# Patient Record
Sex: FEMALE | Race: OTHER | HISPANIC OR LATINO | URBAN - METROPOLITAN AREA
[De-identification: names, ages, dates, MRNs, and addresses within clinical notes are randomized per-mention and may not be internally consistent; named-entity substitution may affect disease eponyms.]

---

## 2022-01-01 ENCOUNTER — INPATIENT (INPATIENT)
Facility: HOSPITAL | Age: 87
LOS: 0 days | DRG: 535 | End: 2022-08-10
Attending: STUDENT IN AN ORGANIZED HEALTH CARE EDUCATION/TRAINING PROGRAM | Admitting: STUDENT IN AN ORGANIZED HEALTH CARE EDUCATION/TRAINING PROGRAM
Payer: MEDICAID

## 2022-01-01 VITALS
SYSTOLIC BLOOD PRESSURE: 124 MMHG | HEART RATE: 71 BPM | OXYGEN SATURATION: 97 % | RESPIRATION RATE: 18 BRPM | TEMPERATURE: 98 F | DIASTOLIC BLOOD PRESSURE: 68 MMHG

## 2022-01-01 VITALS — OXYGEN SATURATION: 98 % | HEART RATE: 71 BPM | SYSTOLIC BLOOD PRESSURE: 121 MMHG | DIASTOLIC BLOOD PRESSURE: 48 MMHG

## 2022-01-01 DIAGNOSIS — Z51.5 ENCOUNTER FOR PALLIATIVE CARE: ICD-10-CM

## 2022-01-01 DIAGNOSIS — J96.01 ACUTE RESPIRATORY FAILURE WITH HYPOXIA: ICD-10-CM

## 2022-01-01 DIAGNOSIS — E43 UNSPECIFIED SEVERE PROTEIN-CALORIE MALNUTRITION: ICD-10-CM

## 2022-01-01 DIAGNOSIS — C50.919 MALIGNANT NEOPLASM OF UNSPECIFIED SITE OF UNSPECIFIED FEMALE BREAST: ICD-10-CM

## 2022-01-01 DIAGNOSIS — S72.009A FRACTURE OF UNSPECIFIED PART OF NECK OF UNSPECIFIED FEMUR, INITIAL ENCOUNTER FOR CLOSED FRACTURE: ICD-10-CM

## 2022-01-01 DIAGNOSIS — Z29.9 ENCOUNTER FOR PROPHYLACTIC MEASURES, UNSPECIFIED: ICD-10-CM

## 2022-01-01 DIAGNOSIS — S72.002A FRACTURE OF UNSPECIFIED PART OF NECK OF LEFT FEMUR, INITIAL ENCOUNTER FOR CLOSED FRACTURE: ICD-10-CM

## 2022-01-01 DIAGNOSIS — R53.81 OTHER MALAISE: ICD-10-CM

## 2022-01-01 DIAGNOSIS — E44.0 MODERATE PROTEIN-CALORIE MALNUTRITION: ICD-10-CM

## 2022-01-01 DIAGNOSIS — Z90.11 ACQUIRED ABSENCE OF RIGHT BREAST AND NIPPLE: Chronic | ICD-10-CM

## 2022-01-01 DIAGNOSIS — N17.9 ACUTE KIDNEY FAILURE, UNSPECIFIED: ICD-10-CM

## 2022-01-01 DIAGNOSIS — N39.0 URINARY TRACT INFECTION, SITE NOT SPECIFIED: ICD-10-CM

## 2022-01-01 DIAGNOSIS — G93.40 ENCEPHALOPATHY, UNSPECIFIED: ICD-10-CM

## 2022-01-01 DIAGNOSIS — M25.552 PAIN IN LEFT HIP: ICD-10-CM

## 2022-01-01 DIAGNOSIS — N28.9 DISORDER OF KIDNEY AND URETER, UNSPECIFIED: ICD-10-CM

## 2022-01-01 DIAGNOSIS — I10 ESSENTIAL (PRIMARY) HYPERTENSION: ICD-10-CM

## 2022-01-01 DIAGNOSIS — F03.90 UNSPECIFIED DEMENTIA WITHOUT BEHAVIORAL DISTURBANCE: ICD-10-CM

## 2022-01-01 LAB
A1C WITH ESTIMATED AVERAGE GLUCOSE RESULT: 6.6 % — HIGH (ref 4–5.6)
ALBUMIN SERPL ELPH-MCNC: 1.6 G/DL — LOW (ref 3.5–5)
ALBUMIN SERPL ELPH-MCNC: 1.7 G/DL — LOW (ref 3.5–5)
ALP SERPL-CCNC: 88 U/L — SIGNIFICANT CHANGE UP (ref 40–120)
ALP SERPL-CCNC: 88 U/L — SIGNIFICANT CHANGE UP (ref 40–120)
ALT FLD-CCNC: 15 U/L DA — SIGNIFICANT CHANGE UP (ref 10–60)
ALT FLD-CCNC: 16 U/L DA — SIGNIFICANT CHANGE UP (ref 10–60)
ANION GAP SERPL CALC-SCNC: 4 MMOL/L — LOW (ref 5–17)
ANION GAP SERPL CALC-SCNC: 4 MMOL/L — LOW (ref 5–17)
APPEARANCE UR: CLEAR — SIGNIFICANT CHANGE UP
APTT BLD: 25.7 SEC — LOW (ref 27.5–35.5)
AST SERPL-CCNC: 11 U/L — SIGNIFICANT CHANGE UP (ref 10–40)
AST SERPL-CCNC: 12 U/L — SIGNIFICANT CHANGE UP (ref 10–40)
BASOPHILS # BLD AUTO: 0.05 K/UL — SIGNIFICANT CHANGE UP (ref 0–0.2)
BASOPHILS NFR BLD AUTO: 0.3 % — SIGNIFICANT CHANGE UP (ref 0–2)
BILIRUB SERPL-MCNC: 0.3 MG/DL — SIGNIFICANT CHANGE UP (ref 0.2–1.2)
BILIRUB SERPL-MCNC: 0.3 MG/DL — SIGNIFICANT CHANGE UP (ref 0.2–1.2)
BILIRUB UR-MCNC: NEGATIVE — SIGNIFICANT CHANGE UP
BUN SERPL-MCNC: 53 MG/DL — HIGH (ref 7–18)
BUN SERPL-MCNC: 53 MG/DL — HIGH (ref 7–18)
CALCIUM SERPL-MCNC: 9.2 MG/DL — SIGNIFICANT CHANGE UP (ref 8.4–10.5)
CALCIUM SERPL-MCNC: 9.7 MG/DL — SIGNIFICANT CHANGE UP (ref 8.4–10.5)
CHLORIDE SERPL-SCNC: 101 MMOL/L — SIGNIFICANT CHANGE UP (ref 96–108)
CHLORIDE SERPL-SCNC: 98 MMOL/L — SIGNIFICANT CHANGE UP (ref 96–108)
CO2 SERPL-SCNC: 32 MMOL/L — HIGH (ref 22–31)
CO2 SERPL-SCNC: 36 MMOL/L — HIGH (ref 22–31)
COLOR SPEC: YELLOW — SIGNIFICANT CHANGE UP
CREAT SERPL-MCNC: 1.55 MG/DL — HIGH (ref 0.5–1.3)
CREAT SERPL-MCNC: 1.66 MG/DL — HIGH (ref 0.5–1.3)
D DIMER BLD IA.RAPID-MCNC: 1659 NG/ML DDU — HIGH
DIFF PNL FLD: ABNORMAL
EGFR: 29 ML/MIN/1.73M2 — LOW
EGFR: 31 ML/MIN/1.73M2 — LOW
EOSINOPHIL # BLD AUTO: 0.02 K/UL — SIGNIFICANT CHANGE UP (ref 0–0.5)
EOSINOPHIL NFR BLD AUTO: 0.1 % — SIGNIFICANT CHANGE UP (ref 0–6)
ESTIMATED AVERAGE GLUCOSE: 143 MG/DL — HIGH (ref 68–114)
GLUCOSE SERPL-MCNC: 153 MG/DL — HIGH (ref 70–99)
GLUCOSE SERPL-MCNC: 188 MG/DL — HIGH (ref 70–99)
GLUCOSE UR QL: NEGATIVE — SIGNIFICANT CHANGE UP
HCT VFR BLD CALC: 35.4 % — SIGNIFICANT CHANGE UP (ref 34.5–45)
HCT VFR BLD CALC: 37.1 % — SIGNIFICANT CHANGE UP (ref 34.5–45)
HGB BLD-MCNC: 10.5 G/DL — LOW (ref 11.5–15.5)
HGB BLD-MCNC: 10.6 G/DL — LOW (ref 11.5–15.5)
IMM GRANULOCYTES NFR BLD AUTO: 2.1 % — HIGH (ref 0–1.5)
INR BLD: 0.98 RATIO — SIGNIFICANT CHANGE UP (ref 0.88–1.16)
KETONES UR-MCNC: NEGATIVE — SIGNIFICANT CHANGE UP
LACTATE SERPL-SCNC: 1.5 MMOL/L — SIGNIFICANT CHANGE UP (ref 0.7–2)
LEUKOCYTE ESTERASE UR-ACNC: ABNORMAL
LYMPHOCYTES # BLD AUTO: 1.88 K/UL — SIGNIFICANT CHANGE UP (ref 1–3.3)
LYMPHOCYTES # BLD AUTO: 12.7 % — LOW (ref 13–44)
MAGNESIUM SERPL-MCNC: 2.6 MG/DL — SIGNIFICANT CHANGE UP (ref 1.6–2.6)
MCHC RBC-ENTMCNC: 24.6 PG — LOW (ref 27–34)
MCHC RBC-ENTMCNC: 24.8 PG — LOW (ref 27–34)
MCHC RBC-ENTMCNC: 28.6 GM/DL — LOW (ref 32–36)
MCHC RBC-ENTMCNC: 29.7 GM/DL — LOW (ref 32–36)
MCV RBC AUTO: 83.5 FL — SIGNIFICANT CHANGE UP (ref 80–100)
MCV RBC AUTO: 86.1 FL — SIGNIFICANT CHANGE UP (ref 80–100)
MONOCYTES # BLD AUTO: 0.87 K/UL — SIGNIFICANT CHANGE UP (ref 0–0.9)
MONOCYTES NFR BLD AUTO: 5.9 % — SIGNIFICANT CHANGE UP (ref 2–14)
NEUTROPHILS # BLD AUTO: 11.63 K/UL — HIGH (ref 1.8–7.4)
NEUTROPHILS NFR BLD AUTO: 78.9 % — HIGH (ref 43–77)
NITRITE UR-MCNC: NEGATIVE — SIGNIFICANT CHANGE UP
NRBC # BLD: 0 /100 WBCS — SIGNIFICANT CHANGE UP (ref 0–0)
NRBC # BLD: 1 /100 WBCS — HIGH (ref 0–0)
PH UR: 5 — SIGNIFICANT CHANGE UP (ref 5–8)
PHOSPHATE SERPL-MCNC: 5.3 MG/DL — HIGH (ref 2.5–4.5)
PLATELET # BLD AUTO: 313 K/UL — SIGNIFICANT CHANGE UP (ref 150–400)
PLATELET # BLD AUTO: 337 K/UL — SIGNIFICANT CHANGE UP (ref 150–400)
POTASSIUM SERPL-MCNC: 5.1 MMOL/L — SIGNIFICANT CHANGE UP (ref 3.5–5.3)
POTASSIUM SERPL-MCNC: 5.6 MMOL/L — HIGH (ref 3.5–5.3)
POTASSIUM SERPL-SCNC: 5.1 MMOL/L — SIGNIFICANT CHANGE UP (ref 3.5–5.3)
POTASSIUM SERPL-SCNC: 5.6 MMOL/L — HIGH (ref 3.5–5.3)
PROT SERPL-MCNC: 7.3 G/DL — SIGNIFICANT CHANGE UP (ref 6–8.3)
PROT SERPL-MCNC: 7.3 G/DL — SIGNIFICANT CHANGE UP (ref 6–8.3)
PROT UR-MCNC: 100
PROTHROM AB SERPL-ACNC: 11.6 SEC — SIGNIFICANT CHANGE UP (ref 10.5–13.4)
RAPID RVP RESULT: SIGNIFICANT CHANGE UP
RBC # BLD: 4.24 M/UL — SIGNIFICANT CHANGE UP (ref 3.8–5.2)
RBC # BLD: 4.31 M/UL — SIGNIFICANT CHANGE UP (ref 3.8–5.2)
RBC # FLD: 16.9 % — HIGH (ref 10.3–14.5)
RBC # FLD: 17.3 % — HIGH (ref 10.3–14.5)
SARS-COV-2 RNA SPEC QL NAA+PROBE: SIGNIFICANT CHANGE UP
SODIUM SERPL-SCNC: 137 MMOL/L — SIGNIFICANT CHANGE UP (ref 135–145)
SODIUM SERPL-SCNC: 138 MMOL/L — SIGNIFICANT CHANGE UP (ref 135–145)
SP GR SPEC: 1.01 — SIGNIFICANT CHANGE UP (ref 1.01–1.02)
UROBILINOGEN FLD QL: 1
WBC # BLD: 14.76 K/UL — HIGH (ref 3.8–10.5)
WBC # BLD: 16.56 K/UL — HIGH (ref 3.8–10.5)
WBC # FLD AUTO: 14.76 K/UL — HIGH (ref 3.8–10.5)
WBC # FLD AUTO: 16.56 K/UL — HIGH (ref 3.8–10.5)

## 2022-01-01 PROCEDURE — 84100 ASSAY OF PHOSPHORUS: CPT

## 2022-01-01 PROCEDURE — 85379 FIBRIN DEGRADATION QUANT: CPT

## 2022-01-01 PROCEDURE — 99284 EMERGENCY DEPT VISIT MOD MDM: CPT

## 2022-01-01 PROCEDURE — 71045 X-RAY EXAM CHEST 1 VIEW: CPT

## 2022-01-01 PROCEDURE — G1004: CPT

## 2022-01-01 PROCEDURE — 83605 ASSAY OF LACTIC ACID: CPT

## 2022-01-01 PROCEDURE — 72192 CT PELVIS W/O DYE: CPT | Mod: MG

## 2022-01-01 PROCEDURE — 99497 ADVNCD CARE PLAN 30 MIN: CPT | Mod: 25

## 2022-01-01 PROCEDURE — 87086 URINE CULTURE/COLONY COUNT: CPT

## 2022-01-01 PROCEDURE — 81001 URINALYSIS AUTO W/SCOPE: CPT

## 2022-01-01 PROCEDURE — 85025 COMPLETE CBC W/AUTO DIFF WBC: CPT

## 2022-01-01 PROCEDURE — 71275 CT ANGIOGRAPHY CHEST: CPT | Mod: 26,MG

## 2022-01-01 PROCEDURE — 93005 ELECTROCARDIOGRAM TRACING: CPT

## 2022-01-01 PROCEDURE — 99285 EMERGENCY DEPT VISIT HI MDM: CPT

## 2022-01-01 PROCEDURE — 99222 1ST HOSP IP/OBS MODERATE 55: CPT

## 2022-01-01 PROCEDURE — 99254 IP/OBS CNSLTJ NEW/EST MOD 60: CPT

## 2022-01-01 PROCEDURE — 86738 MYCOPLASMA ANTIBODY: CPT

## 2022-01-01 PROCEDURE — 85610 PROTHROMBIN TIME: CPT

## 2022-01-01 PROCEDURE — 85027 COMPLETE CBC AUTOMATED: CPT

## 2022-01-01 PROCEDURE — 83735 ASSAY OF MAGNESIUM: CPT

## 2022-01-01 PROCEDURE — 72192 CT PELVIS W/O DYE: CPT | Mod: 26,MG

## 2022-01-01 PROCEDURE — 99223 1ST HOSP IP/OBS HIGH 75: CPT

## 2022-01-01 PROCEDURE — 85730 THROMBOPLASTIN TIME PARTIAL: CPT

## 2022-01-01 PROCEDURE — 80053 COMPREHEN METABOLIC PANEL: CPT

## 2022-01-01 PROCEDURE — 71045 X-RAY EXAM CHEST 1 VIEW: CPT | Mod: 26

## 2022-01-01 PROCEDURE — 0225U NFCT DS DNA&RNA 21 SARSCOV2: CPT

## 2022-01-01 PROCEDURE — 71275 CT ANGIOGRAPHY CHEST: CPT | Mod: MG

## 2022-01-01 PROCEDURE — 83036 HEMOGLOBIN GLYCOSYLATED A1C: CPT

## 2022-01-01 PROCEDURE — 87040 BLOOD CULTURE FOR BACTERIA: CPT

## 2022-01-01 PROCEDURE — 87186 SC STD MICRODIL/AGAR DIL: CPT

## 2022-01-01 PROCEDURE — 36415 COLL VENOUS BLD VENIPUNCTURE: CPT

## 2022-01-01 RX ORDER — ACETAMINOPHEN 500 MG
650 TABLET ORAL EVERY 6 HOURS
Refills: 0 | Status: DISCONTINUED | OUTPATIENT
Start: 2022-01-01 | End: 2022-01-01

## 2022-01-01 RX ORDER — ACETAMINOPHEN 500 MG
650 TABLET ORAL ONCE
Refills: 0 | Status: COMPLETED | OUTPATIENT
Start: 2022-01-01 | End: 2022-01-01

## 2022-01-01 RX ORDER — AZTREONAM 2 G
2000 VIAL (EA) INJECTION ONCE
Refills: 0 | Status: COMPLETED | OUTPATIENT
Start: 2022-01-01 | End: 2022-01-01

## 2022-01-01 RX ORDER — ACETAMINOPHEN 500 MG
1000 TABLET ORAL ONCE
Refills: 0 | Status: DISCONTINUED | OUTPATIENT
Start: 2022-01-01 | End: 2022-01-01

## 2022-01-01 RX ORDER — HYDROMORPHONE HYDROCHLORIDE 2 MG/ML
0.5 INJECTION INTRAMUSCULAR; INTRAVENOUS; SUBCUTANEOUS
Refills: 0 | Status: DISCONTINUED | OUTPATIENT
Start: 2022-01-01 | End: 2022-01-01

## 2022-01-01 RX ORDER — ASPIRIN/CALCIUM CARB/MAGNESIUM 324 MG
0 TABLET ORAL
Qty: 0 | Refills: 0 | DISCHARGE

## 2022-01-01 RX ORDER — HALOPERIDOL DECANOATE 100 MG/ML
0.5 INJECTION INTRAMUSCULAR EVERY 6 HOURS
Refills: 0 | Status: DISCONTINUED | OUTPATIENT
Start: 2022-01-01 | End: 2022-01-01

## 2022-01-01 RX ORDER — CEFEPIME 1 G/1
2000 INJECTION, POWDER, FOR SOLUTION INTRAMUSCULAR; INTRAVENOUS EVERY 12 HOURS
Refills: 0 | Status: DISCONTINUED | OUTPATIENT
Start: 2022-01-01 | End: 2022-01-01

## 2022-01-01 RX ORDER — VANCOMYCIN HCL 1 G
1000 VIAL (EA) INTRAVENOUS ONCE
Refills: 0 | Status: COMPLETED | OUTPATIENT
Start: 2022-01-01 | End: 2022-01-01

## 2022-01-01 RX ORDER — SODIUM CHLORIDE 9 MG/ML
1000 INJECTION INTRAMUSCULAR; INTRAVENOUS; SUBCUTANEOUS ONCE
Refills: 0 | Status: COMPLETED | OUTPATIENT
Start: 2022-01-01 | End: 2022-01-01

## 2022-01-01 RX ORDER — SODIUM CHLORIDE 9 MG/ML
1000 INJECTION, SOLUTION INTRAVENOUS
Refills: 0 | Status: DISCONTINUED | OUTPATIENT
Start: 2022-01-01 | End: 2022-01-01

## 2022-01-01 RX ORDER — CANDESARTAN CILEXETIL 8 MG/1
0 TABLET ORAL
Qty: 0 | Refills: 0 | DISCHARGE

## 2022-01-01 RX ORDER — METRONIDAZOLE 500 MG
500 TABLET ORAL EVERY 8 HOURS
Refills: 0 | Status: DISCONTINUED | OUTPATIENT
Start: 2022-01-01 | End: 2022-01-01

## 2022-01-01 RX ORDER — HYDROMORPHONE HYDROCHLORIDE 2 MG/ML
0.5 INJECTION INTRAMUSCULAR; INTRAVENOUS; SUBCUTANEOUS ONCE
Refills: 0 | Status: DISCONTINUED | OUTPATIENT
Start: 2022-01-01 | End: 2022-01-01

## 2022-01-01 RX ORDER — HEPARIN SODIUM 5000 [USP'U]/ML
5000 INJECTION INTRAVENOUS; SUBCUTANEOUS ONCE
Refills: 0 | Status: COMPLETED | OUTPATIENT
Start: 2022-01-01 | End: 2022-01-01

## 2022-01-01 RX ORDER — ANASTROZOLE 1 MG/1
0 TABLET ORAL
Qty: 0 | Refills: 0 | DISCHARGE

## 2022-01-01 RX ORDER — CEFEPIME 1 G/1
2000 INJECTION, POWDER, FOR SOLUTION INTRAMUSCULAR; INTRAVENOUS ONCE
Refills: 0 | Status: COMPLETED | OUTPATIENT
Start: 2022-01-01 | End: 2022-01-01

## 2022-01-01 RX ORDER — CEFEPIME 1 G/1
INJECTION, POWDER, FOR SOLUTION INTRAMUSCULAR; INTRAVENOUS
Refills: 0 | Status: DISCONTINUED | OUTPATIENT
Start: 2022-01-01 | End: 2022-01-01

## 2022-01-01 RX ADMIN — HEPARIN SODIUM 5000 UNIT(S): 5000 INJECTION INTRAVENOUS; SUBCUTANEOUS at 06:20

## 2022-01-01 RX ADMIN — Medication 100 MILLIGRAM(S): at 23:35

## 2022-01-01 RX ADMIN — Medication 100 MILLIGRAM(S): at 03:08

## 2022-01-01 RX ADMIN — HYDROMORPHONE HYDROCHLORIDE 0.5 MILLIGRAM(S): 2 INJECTION INTRAMUSCULAR; INTRAVENOUS; SUBCUTANEOUS at 12:55

## 2022-01-01 RX ADMIN — SODIUM CHLORIDE 1000 MILLILITER(S): 9 INJECTION INTRAMUSCULAR; INTRAVENOUS; SUBCUTANEOUS at 12:14

## 2022-01-01 RX ADMIN — HYDROMORPHONE HYDROCHLORIDE 0.5 MILLIGRAM(S): 2 INJECTION INTRAMUSCULAR; INTRAVENOUS; SUBCUTANEOUS at 12:14

## 2022-01-01 RX ADMIN — SODIUM CHLORIDE 1000 MILLILITER(S): 9 INJECTION INTRAMUSCULAR; INTRAVENOUS; SUBCUTANEOUS at 23:36

## 2022-01-01 RX ADMIN — Medication 250 MILLIGRAM(S): at 14:25

## 2022-01-01 RX ADMIN — CEFEPIME 100 MILLIGRAM(S): 1 INJECTION, POWDER, FOR SOLUTION INTRAMUSCULAR; INTRAVENOUS at 03:08

## 2022-08-09 NOTE — ED ADULT TRIAGE NOTE - CCCP TRG CHIEF CMPLNT
Low O2 saturation
You can access the FollowMyHealth Patient Portal offered by [facility] by registering at the following website: [email or web address]. By joining [facility]’s FollowMyHealth portal, you will also be able to view your health information using other applications (apps) compatible with our system.

## 2022-08-09 NOTE — ED PROVIDER NOTE - OBJECTIVE STATEMENT
89 y/o female, accompanied by son here for left leg pain after fall yesterday and hypoxia.  Pt Polish speaking.  Translation services offered however patient prefers to use son at bedside.  Pt sometimes ambulates with a walker.  Yesterday, the patient woke up, and when she went to get up, was weak and fell over.  No head trauma, No LOC.  Family was able to help the patient up.  The patient was able to move all extremities and the patient stayed in bed for the rest of the day.  Today, the family noted the patient could not get up so they called EMS.  Upon EMS arrival the patient was noted to be hypoxic below 70% which increased with supplemental oxygen.  PMHx of breast cancer with right mastectomy done in [place] and HTN.

## 2022-08-09 NOTE — ED ADULT NURSE NOTE - OBJECTIVE STATEMENT
assumed care of 89 yo female who presented to the Ed for c/o SOB and leg pain. Pt placed on stretcher with cardiac monitor. Spo2 100 %

## 2022-08-09 NOTE — ED PROVIDER NOTE - CLINICAL SUMMARY MEDICAL DECISION MAKING FREE TEXT BOX
pt with history of breast cancer s/p mastectomy presents after fall yesterday.  Right lower extremity shortened and rotated.  pt also hypoxic.  Concern for femur fracture and possible PE.  Will check labs, CT chest/ pelvis, labs, and reassess.

## 2022-08-09 NOTE — CHART NOTE - NSCHARTNOTEFT_GEN_A_CORE
Ortho PA made aware of patient with left hip fracture on CT scan. Official consult to follow. Will pre-op for possible left hip surgery tomorrow 8/10/22.  -Keep patient NPO with IV fluids after midnight  -Will discuss surgical intervention with patient and family  -Xray of Pelvis and Left Hip ordered. CT scan shows left femoral neck fracture, but after review may be Intertrochanteric Fracture. Will need xrays to determine procedure.  -Official consult to follow  -Case discussed with Dr. Chavez

## 2022-08-09 NOTE — ED ADULT TRIAGE NOTE - PATIENT ON (OXYGEN DELIVERY METHOD)
[address] [facility]  consulted by Dr. Sam Green for monitoring and adjustment. Indication for treatment: OM R foot  Goal trough: 15 mcg/mL     Pertinent Laboratory Values:   Temp Readings from Last 3 Encounters:   04/04/20 97.7 °F (36.5 °C) (Oral)   04/01/20 98.4 °F (36.9 °C) (Oral)   07/05/19 97.6 °F (36.4 °C) (Oral)     No results for input(s): WBC, LACTATE in the last 72 hours. Recent Labs     04/02/20  0331 04/04/20  0700   CREATININE 0.7* 0.6*     Estimated Creatinine Clearance: 125 mL/min (A) (based on SCr of 0.6 mg/dL (L)). Intake/Output Summary (Last 24 hours) at 4/4/2020 1021  Last data filed at 4/4/2020 0836  Gross per 24 hour   Intake 490 ml   Output 2000 ml   Net -1510 ml     Vancomycin level:   TROUGH:    No results for input(s): VANCOTROUGH in the last 72 hours. RANDOM:  No results for input(s): VANCORANDOM in the last 72 hours.     Assessment:  · WBC and temperature: No new WBC; afebrile  · SCr, BUN, and urine output: stable   · Day(s) of therapy: 13  · Vancomycin level:   · 3/27: 14.6, therapeutic (1250 mg q12h)   · 3/30: 16.9, therapeutic (1250 mg q12h) accumulation  · 4/1: 18.3, therapeutic (1000 mg q12h)    Plan:  · Continue vancomycin 1000 mg q12h   · Pharmacy will continue to monitor patient and adjust therapy as indicated    REPEAT Em Baron FOR 4/6 @5866    Thank you for the consult,    Mahad Cespedes, PharmD, [facility] mask, nonrebreather

## 2022-08-09 NOTE — ED PROVIDER NOTE - PROGRESS NOTE DETAILS
CT shows Comminuted, displaced left femoral neck fracture. ortho PA paged with no answer.  Ortho attending, Dr. Chavez made aware. CTA negaitve for PE.  pt will be admitted to [facility].

## 2022-08-10 NOTE — H&P ADULT - NSHPPHYSICALEXAM_GEN_ALL_CORE
General - Elderly, confused, in respiratory distress  Eyes - PERRLA, EOM intact  ENT - Nonicteric sclerae, PERRLA, EOMI. Oropharynx clear. Moist mucous membranes. Conjunctivae appear well perfused.   Neck - No noticeable or palpable swelling, redness or rash around throat or on face  Lymph Nodes - No lymphadenopathy  Cardiovascular - RRR no m/r/g, no JVD, no carotid bruits  Lungs - (+) b/l crackles on auscultation in all lung fields.  Skin - No rashes, skin warm and dry, no erythematous areas  Abdomen - Normal bowel sounds, abdomen soft and nontender  Rectal – Rectal exam not performed since no symptoms indicated blood loss.  Extremities - (+) Left leg externally rotated and shortened.  Musculoskeletal - 5/5 strength, normal range of motion, no swollen or erythematous joints.  Neuro– Alert and oriented x 1

## 2022-08-10 NOTE — H&P ADULT - ATTENDING COMMENTS
Pt seen and examined  Case discussed with MAR    89 year old woman visiting family from with PMH of HTN, hx of breast cancer Pt seen and examined  Case discussed with MAR    89 year old woman visiting family from with PMH of HTN, hx of breast cancer s/p mastectomy s/p chemo with residual axillary disease ( per family) who was brought in yesterday on account of a fall 2 days prior and inability to ambulate yesterday.   On closer questioning, pt with about 3 days of weakness, fatigue, productive cough, SOB leading to the fall.     Per EMS - pt was hypoxic requiring O2 via non rebreather mask    Vital Signs Last 24 Hrs  T(C): 36.2 (09 Aug 2022 23:43), Max: 36.4 (09 Aug 2022 15:17)  T(F): 97.1 (09 Aug 2022 23:43), Max: 97.6 (09 Aug 2022 15:17)  HR: 68 (09 Aug 2022 23:43) (68 - 71)  BP: 114/64 (09 Aug 2022 23:43) (114/64 - 124/68)  RR: 18 (09 Aug 2022 23:43) (18 - 18)  SpO2: 98% (09 Aug 2022 23:43) (97% - 98%)  Parameters below as of 09 Aug 2022 23:43  Patient On (Oxygen Delivery Method): mask, nonrebreather    Pt seen at bedside with family   Elderly woman, Lying in bed, awake but appears drowsy, confused, in distress - both respiratory and pain  Nonrebreather mask in place @ 15LPM - weaned down to 10LPM with saturation btw %  Appreciable air entry B/L   Crackles more appreciable in the right lung  Soft abdomen NTND   Left lower extremity -shorter than right and externally rotated.  Neuro exam deferred    Labs                         10.5   14.76 )-----------( 337      ( 09 Aug 2022 16:30 )             35.4     0809    138  |  98  |  53<H>  ----------------------------<  153<H>  5.1   |  36<H>  |  1.55<H>    Ca    9.7      09 Aug 2022 16:30  TPro  7.3  /  Alb  1.7<L>  /  TBili  0.3  /  DBili  x   /  AST  11  /  ALT  15  /  AlkPhos  88  08    D-dimer - 1659      Urinalysis Basic - ( 10 Aug 2022 02:31 )  Color: Yellow / Appearance: Clear / S.015 / pH: x  Gluc: x / Ketone: Negative  / Bili: Negative / Urobili: 1   Blood: x / Protein: 100 / Nitrite: Negative   Leuk Esterase: Moderate / RBC: 2-5 /HPF / WBC 11-25 /HPF   Sq Epi: x / Non Sq Epi: Few /HPF / Bacteria: Moderate /HPF     CT chest   Multifocal bronchiectasis most severe in the right middle lobe lingula and medial right lower lobe. Centrilobular and branching linear densities throughout all 5 lobes and a few areas of consolidation in the right upper lobe. There are small left greater than   right pleural effusions.  Mediastinum/Lymph nodes: No thoracic adenopathy.  Bones and Soft Tissues: Status post right mastectomy.    IMPRESSION:    1.  No pulmonary thromboembolism    2.  Findings of chronic large and small airways infection with a few   patchy areas of consolidation which may represent acute worsening or   superimposed bacterial pneumonia Pt seen and examined  Case discussed with MAR    89 year old woman visiting family from with PMH of HTN, hx of breast cancer s/p mastectomy s/p chemo with residual axillary disease ( per family) who was brought in yesterday on account of a fall 2 days prior and inability to ambulate yesterday.   On closer questioning, pt with about 3 days of weakness, fatigue, productive cough, SOB leading to the fall.     Per EMS - pt was hypoxic requiring O2 via non rebreather mask    Vital Signs Last 24 Hrs  T(C): 36.2 (09 Aug 2022 23:43), Max: 36.4 (09 Aug 2022 15:17)  T(F): 97.1 (09 Aug 2022 23:43), Max: 97.6 (09 Aug 2022 15:17)  HR: 68 (09 Aug 2022 23:43) (68 - 71)  BP: 114/64 (09 Aug 2022 23:43) (114/64 - 124/68)  RR: 18 (09 Aug 2022 23:43) (18 - 18)  SpO2: 98% (09 Aug 2022 23:43) (97% - 98%)  Parameters below as of 09 Aug 2022 23:43  Patient On (Oxygen Delivery Method): mask, nonrebreather    Pt seen at bedside with family   Elderly woman, Lying in bed, awake but appears drowsy, confused, in distress - both respiratory and pain  Nonrebreather mask in place @ 15LPM - weaned down to 10LPM with saturation btw %  Appreciable air entry B/L   Crackles more appreciable in the right lung  Soft abdomen NTND   Left lower extremity -shorter than right and externally rotated.  Neuro exam deferred    Labs                         10.5   14.76 )-----------( 337      ( 09 Aug 2022 16:30 )             35.4     0809    138  |  98  |  53<H>  ----------------------------<  153<H>  5.1   |  36<H>  |  1.55<H>    Ca    9.7      09 Aug 2022 16:30  TPro  7.3  /  Alb  1.7<L>  /  TBili  0.3  /  DBili  x   /  AST  11  /  ALT  15  /  AlkPhos  88  08    D-dimer - 1659      Urinalysis Basic - ( 10 Aug 2022 02:31 )  Color: Yellow / Appearance: Clear / S.015 / pH: x  Gluc: x / Ketone: Negative  / Bili: Negative / Urobili: 1   Blood: x / Protein: 100 / Nitrite: Negative   Leuk Esterase: Moderate / RBC: 2-5 /HPF / WBC 11-25 /HPF   Sq Epi: x / Non Sq Epi: Few /HPF / Bacteria: Moderate /HPF     CT chest   Multifocal bronchiectasis most severe in the right middle lobe lingula and medial right lower lobe. Centrilobular and branching linear densities throughout all 5 lobes and a few areas of consolidation in the right upper lobe. There are small left greater than   right pleural effusions.  Mediastinum/Lymph nodes: No thoracic adenopathy.  Bones and Soft Tissues: Status post right mastectomy.  1.  No pulmonary thromboembolism  2.  Findings of chronic large and small airways infection with a few patchy areas of consolidation which may represent acute worsening or superimposed bacterial pneumonia    Impression  89 year old man with PMH as detailed above with symptoms concerning for acute pneumonia with acute respiratory failure with hypoxia and progressive  generalized weakness and debility leading to mechanical fall and left hip fracture   Pt has progressive worsened since ED presentation with acute confusion and increasing oxygen requirement.  During this time goals of care discussed with family and this has been defined within the MOLST form.     A/P   - Acute encephalopathy   - Acute respiratory failure with hypoxia  - Multilobar right sided pneumonia - community acquired  - Suspected chronic bronchiectasis with possible hypercapnic resp failure at baseline  - Dehydration with ? contraction alkalosis  - Left comminuted displaced fracture of the left femur  - UTI   - renal insufficiency - CANDY vs CKD vs CANDY on CKD    Plan   - Admit to medicine  -  Supplemental O2 to keep SaO2 > 96%  - Sepsis work up   - Empiric broad spectrum antibiotics s/p aztreonam 2g initially used because of Penicillin allergy. Will switch to cefepime and flagyl renally dosed  - ID consult in AM  - Gentle IVF hydration   - Orthopedic consult   - Pre-op evaluation after management of acute presentation and patient improves  - Goals of care discussion completed.

## 2022-08-10 NOTE — CONSULT NOTE ADULT - PROBLEM SELECTOR RECOMMENDATION 5
Clinical evidence indicates that the patient has Moderate protein calorie malnutrition/ 2nd degree Albumin 1.6.  Poor po intake prior to admission     In context of Chronic Illness (>1 month)  Energy/Food intake <75% of estimated energy requirement >7 days  Weight loss: Mild  (lbs lost recently)  Body Fat loss: Mild    Muscle mass loss: Mild   Fluid Accumulation: Mild    Strength: weakened Mild     Recommend:   Diet as tolerated aspiration precautions, keep head of bed at least 45 degrees during feeding

## 2022-08-10 NOTE — DISCHARGE NOTE FOR THE EXPIRED PATIENT - HOSPITAL COURSE
89 y/o F with PMH breast CA (s/p right mastectomy) and HTN BIBEMS due to fall admitted for left leg fracture and acute hypoxic respiratory failure.  Patient placed on NRB, unable to clear for surgery.  Increased lethargy, family requested to keep patient comfortable.  After 3 PM patient without pulse or respiratory effort.

## 2022-08-10 NOTE — H&P ADULT - PROBLEM SELECTOR PLAN 4
h/o breast cancer  s/p right mastectomy elevated BUN and Cr, unknown baseline  c/w IVF UA +ve   f/u UCx  Abx

## 2022-08-10 NOTE — CONSULT NOTE ADULT - PROBLEM SELECTOR RECOMMENDATION 9
p/w acute hypoxic respiratory failure, sat < 70%.  pt remains on NRB agitated w dyspnea.   Discussed hospice philosophy and services provided.  Explained given poor prognosis on NRB hours to days pt is appropriate for IPU.  DNR/DNI on file    -dilaudid 0.5 mg IVP q3h prn for dyspnea  -haldol 0.5 mg IVP q6h prn for agitation  -glycopyrrolate 0.2 .g IVP q4 h prn for secretion  -comfort measures only              -CTA chest 8/9/22: chronic large and small airways infection with a few patchy areas of consolidation which may represent acute worsening or superimposed bacterial pneumonia  -CXR: Increased interstitial lung markings with small bilateral pleural effusions and adjacent mild opacities p/w acute hypoxic respiratory failure, sat < 70%.  pt remains on NRB agitated w dyspnea.   Discussed hospice philosophy and services provided.  Explained given poor prognosis on NRB hours to days pt is appropriate for IPU.  DNR/DNI on file    -dilaudid 0.5 mg IVP q3h prn for dyspnea  -haldol 0.5 mg IVP q6h prn for agitation  -glycopyrrolate 0.2 .g IVP q4 h prn for secretion  -comfort measures only  -continue abx               -CTA chest 8/9/22: chronic large and small airways infection with a few patchy areas of consolidation which may represent acute worsening or superimposed bacterial pneumonia  -CXR: Increased interstitial lung markings with small bilateral pleural effusions and adjacent mild opacities

## 2022-08-10 NOTE — GOALS OF CARE CONVERSATION - ADVANCED CARE PLANNING - CONVERSATION DETAILS
Extensive discussion with Ms Caballero pt's daughter at bedside regarding GOC. Pt admitted for fall has facture but found to have bilateral pneumonia requiring 10lit NRB O2 sat 97%. On RA pt is less than 82%. Risks and benefits of intubation CPR discussed with Ms Caballero given her age and comorbs (baseline dementia, dependent for ADLs, HTN). Ms Caballero discussed with her another sister and decided to make patient DNR/DNI. GISELLA signed and drafted in chart. Extensive discussion with Ms Caballero pt's daughter at bedside regarding GOC with shasha  Naina ID # [id number]. Pt admitted for fall has facture but found to have bilateral pneumonia requiring 10lit NRB O2 sat 97%. On RA pt is less than 82%. Risks and benefits of intubation CPR discussed with Ms Caballero given her age and comorbs (baseline dementia, dependent for ADLs, HTN). Ms Caballero discussed with her another sister and decided to make patient DNR/DNI. GISELLA signed and drafted in chart.

## 2022-08-10 NOTE — H&P ADULT - PROBLEM SELECTOR PLAN 8
Will give one dose of heparin for now with scd boot  Primary team to start dvt ppx heparin if not going for surgery

## 2022-08-10 NOTE — H&P ADULT - HISTORY OF PRESENT ILLNESS
89 y/o F with PMH breast CA and HTN BIBEMS due to a fall. Pt is visiting her family from  and normally ambulates with a cane. She felt weak while getting up yesterday and fell on her left side. Her family kept in her in bed for the rest of the day. They called EMS after noting he was not able to get up this morning. The family also endorsed that she had SOB and a productive cough with green sputum since Saturday. En route to ED, EMS reported she was hypoxic with SaO2 < 70%. Denies CP, fevers, chills, palpitations, abdominal pain, N/V/D, and dysuria.     In ED: pt is on 10L non rebreather, sat. 97%. On RA < 82%.  89 y/o F with PMH breast CA and HTN, ?dementia BIBEMS due to a fall. Pt is visiting her family from  and normally ambulates with a cane/assistance. She felt weak while getting up yesterday and fell on her left side. Her family kept in her in bed for the rest of the day. They called EMS after noting she was not able to get up this morning. The family also endorsed that she had SOB and a productive cough with green sputum since Saturday. En route to ED, EMS reported she was hypoxic with SaO2 < 70%. Denies CP, fevers, chills, palpitations, abdominal pain, N/V/D, and dysuria. Pt was confused in ED , AxO 0 and open eyes on verbal stimulus but not following commands     In ED: pt is on 10L non rebreather, sat. 97%. On RA < 82%.

## 2022-08-10 NOTE — H&P ADULT - NSHPREVIEWOFSYSTEMS_GEN_ALL_CORE
CONSTITUTIONAL: No fever, weight loss, or fatigue  RESPIRATORY: (+) SOB with productive cough No wheezing, chills or hemoptysis; No shortness of breath  CARDIOVASCULAR: No chest pain, palpitations, dizziness, or leg swelling  GASTROINTESTINAL: No abdominal pain. No nausea, vomiting, or hematemesis; No diarrhea or constipation. No melena or hematochezia.  GENITOURINARY: No dysuria or hematuria, urinary frequency  NEUROLOGICAL: No headaches, memory loss, loss of strength, numbness, or tremors  ENDOCRINE: No polyuria, polydipsia, or heat/cold intolerance  MUSKULOSKELETAL: (+) Left hip pain. No muscle aches, joint pains  HEME: no easy bruisability, no tender or enlarged lymph nodes  SKIN: No itching, burning, rashes, or lesions .

## 2022-08-10 NOTE — CONSULT NOTE ADULT - SUBJECTIVE AND OBJECTIVE BOX
From: Ceferino Arceo  To: Eladia Castro MD  Sent: 8/2/2019 2:16 PM CDT  Subject: Medication Question    Hi Dr Montilla,    I was just sitting at my desk and broke into tears. Very strange. This hasn't happened without having something on my mind. I'm not over loaded with work and am not stressed. I do feel a bit foggy in the head.     I've contacted scheduling at [facility] and Rachael isn't taking new patients.    Do you have and suggestions. Medication change or?    Thank you,  Ceferino   Pt Name: SALOME CALLAWAY  MRN: [id number]    ORTHOPEDIC CONSULT:    Orthopedic diagnosis:    HPI: Patient is a 89y Female presenting to hospital with hypoxia and left hip pain. Patient is poor historian. History obtained from H&P and daughter Federico Minaya at [phone]. Daughter states that on Saturday, patient was complaining of trouble breathing. On Monday, patient had fall in her home. She was then placed in bed and was unable to get up for rest of day. Family had then called EMS because patient was not getting out of bed.   89 y/o F with PMH breast CA and HTN, ?dementia BIBEMS due to a fall. Pt is visiting her family from  and normally ambulates with a cane/assistance. She felt weak while getting up yesterday and fell on her left side. Her family kept in her in bed for the rest of the day. They called EMS after noting she was not able to get up this morning. The family also endorsed that she had SOB and a productive cough with green sputum since Saturday. En route to ED, EMS reported she was hypoxic with SaO2 < 70%. Denies CP, fevers, chills, palpitations, abdominal pain, N/V/D, and dysuria. Pt was confused in ED , AxO 0 and open eyes on verbal stimulus but not following commands.    PAST MEDICAL & SURGICAL HISTORY:  Breast cancer      HTN (hypertension)      S/P right mastectomy          ALLERGIES: penicillin (Unknown)      MEDICATIONS: acetaminophen  Suppository .. 650 milliGRAM(s) Rectal every 6 hours PRN  cefepime   IVPB      cefepime   IVPB 2000 milliGRAM(s) IV Intermittent every 12 hours  lactated ringers. 1000 milliLiter(s) IV Continuous <Continuous>  metroNIDAZOLE  IVPB 500 milliGRAM(s) IV Intermittent every 8 hours      PHYSICAL EXAM:    Vital Signs Last 24 Hrs  T(C): 36.4 (10 Aug 2022 04:49), Max: 36.4 (09 Aug 2022 15:17)  T(F): 97.6 (10 Aug 2022 04:49), Max: 97.6 (09 Aug 2022 15:17)  HR: 75 (10 Aug 2022 04:49) (68 - 78)  BP: 125/46 (10 Aug 2022 04:49) (108/78 - 125/46)  BP(mean): 65 (10 Aug 2022 04:49) (65 - 65)  RR: 18 (10 Aug 2022 04:49) (18 - 18)  SpO2: 97% (10 Aug 2022 04:49) (97% - 98%)    Parameters below as of 10 Aug 2022 04:49  Patient On (Oxygen Delivery Method): mask, nonrebreather  O2 Flow (L/min): 10      Left Lower Extremity: Skin intact. LLE shortened and externally rotated. Calves soft and NT. Patient unable to follow commands for ROM during exam.         LABS:                        10.5   14.76 )-----------( 337      ( 09 Aug 2022 16:30 )             35.4     08-09    138  |  98  |  53<H>  ----------------------------<  153<H>  5.1   |  36<H>  |  1.55<H>    Ca    9.7      09 Aug 2022 16:30    TPro  7.3  /  Alb  1.7<L>  /  TBili  0.3  /  DBili  x   /  AST  11  /  ALT  15  /  AlkPhos  88  08-09    PT/INR - ( 09 Aug 2022 16:30 )   PT: 11.6 sec;   INR: 0.98 ratio         PTT - ( 09 Aug 2022 16:30 )  PTT:25.7 sec    RADIOLOGY:   < from: CT Pelvis Bony Only No Cont (08.09.22 @ 18:53) >    ACC: [id number] EXAM:  CT PELVIS BONY ONLY                          PROCEDURE DATE:  08/09/2022          INTERPRETATION:  INDICATION: Fall, left hip pain    TECHNIQUE: CT of the pelvis without contrast with axial, sagittal, and   coronal multiplanar reformats.    Comparison: None.    FINDINGS:    Acute, comminuted, displaced fracture of the   intertrochanteric/basicervical left femoral neck. There is shortening at   the fracture site with varus angulation and mild apex anterior angulation   at the fracture site. Left femoral head appears well-seated within the   acetabulum.    Mild bilateral hip arthrosis. Degenerative changes within the lower   lumbar spine, pubic symphysis, and bilateral sacroiliac joints.    Hematoma/soft tissue swelling surrounding the fracture site.    Colonic diverticulosis. Vascular calcifications noted.    IMPRESSION:    Comminuted, displaced left femoral neck fracture, as above.    --- End of Report ---            VANESSA ENEIDA M.D., ATTENDING RADIOLOGIST  This document has been electronically signed. Aug  9 2022  7:12PM    < end of copied text >        IMPRESSION: Pt is a 89y Female with Left Hip Intertrochanteric Fracture    PLAN:  -  Pain management  -  DVT prophylaxis  -  Discussed with daughter (Federico Minaya at [phone]) risks and benefits of conservative mangement and surgical management including but not limited to such as bed ulcers, persistent pain, DVT, PE, pneumonia. At the moment daughter is undecided about conservative or surgical management. She states she would like to discuss after medical evaluation and risk level of patient.   -  Obtain medical clearance. Upon clearance will discuss with daughter again options of conservative vs surgical management of fracture  -  Case discussed with Dr. Chavez

## 2022-08-10 NOTE — CONSULT NOTE ADULT - ASSESSMENT
Confidential Drug Utilization Report  Search Terms: Anna Edwards, 08/08/1929Search Date: 08/10/2022 10:45:25 AM  The Drug Utilization Report below displays all of the controlled substance prescriptions, if any, that your patient has filled in the last twelve months. The information displayed on this report is compiled from pharmacy submissions to the Department, and accurately reflects the information as submitted by the pharmacies.    This report was requested by: Mariluz Rubin | Reference #: [id number]    There are no results for the search terms that you entered.

## 2022-08-10 NOTE — H&P ADULT - PROBLEM SELECTOR PLAN 3
pt BIBEMS due to fall on left side  CT Pelvis: Comminuted, displaced left femoral neck fracture, as above.  ortho consulted pt BIBEMS due to fall on left side  CT Pelvis: Comminuted, displaced left femoral neck fracture, as above.  ortho consulted likely go for surgery tomorrow>waiting for final recs   NPO after midnight

## 2022-08-10 NOTE — CONSULT NOTE ADULT - CONVERSATION DETAILS
Met with patient's daughter's Lizz and Federico at the bedside regarding further GOC.   # [id number] facilitated discussion.  Discussed patient's current condition, high risk of mortality on this admission. Family reminisced about the pt and shared she had 12 children 8 of whom are still living, pt is a great mother. Pt came to visit from  in July, celebrated her birthday with plans to return on 8/17.  They do not want her to suffer.    Confirmed MOLST on file: DNR/DNI. Family just wants to kept pt comfortable, they do not want her to suffer. Explained given her prognosis she is appropriate for inpatient hospice. Discussed hospice philosophy and location. Daughter's are agreeable for impatient hospice.   Chaplaincy offered and accepted.    All questions answered.  Support provided.   D/w primary team Met with patient's daughter's Lizz and Elissaparker at the bedside regarding further GOC.   # [id number] facilitated discussion.  Discussed patient's current condition, high risk of mortality on this admission. Family reminisced about the pt and shared she had 12 children 8 of whom are still living, pt is a great mother. Pt came to visit from  in July, celebrated her birthday with plans to return on 8/17.  They do not want her to suffer.    Confirmed MOLST on file: DNR/DNI.  Explained given her prognosis she is appropriate for inpatient hospice. Discussed hospice philosophy and location.  Family just wants to kept pt comfortable, they do not want her to suffer. No further labs. Continue antibiotics.  Daughter's are agreeable for impatient hospice.   Chaplaincy offered and accepted.    All questions answered.  Support provided.   D/w primary team

## 2022-08-10 NOTE — CONSULT NOTE ADULT - PROBLEM SELECTOR RECOMMENDATION 9
Pt with acute left hip pain which is somatic in nature due to left comminuted, displaced femoral neck fracture. GOHCO. Family undecided whether to go forth with surgical route. Pt on O2 via nonrebreather. + CANDY + hx dementia. DNR.   Non-opioid pain recommendations   - Acetaminophen 1 gram IV once PRN moderate pain.   Bowel Regimen  - n/a pt NPO  Mild pain   - Non-pharmacological pain treatment recommendations  - Warm/ Cool packs PRN   - Repositioning, relaxation, distraction.  Recommendations discussed with primary team and RN.

## 2022-08-10 NOTE — CONSULT NOTE ADULT - CONSULT REASON
Discuss complex medical decision making related to goals of care due to AFRF on NRB Discuss complex medical decision making related to goals of care due to AHRF on NRB

## 2022-08-10 NOTE — CONSULT NOTE ADULT - SUBJECTIVE AND OBJECTIVE BOX
Source of information: [facility], Chart review  Patient language: English  : n/a    HPI:  89 y/o F with PMH breast CA and HTN, ?dementia BIBEMS due to a fall. Pt is visiting her family from  and normally ambulates with a cane/assistance. She felt weak while getting up yesterday and fell on her left side. Her family kept in her in bed for the rest of the day. They called EMS after noting she was not able to get up this morning. The family also endorsed that she had SOB and a productive cough with green sputum since Saturday. En route to ED, EMS reported she was hypoxic with SaO2 < 70%. Denies CP, fevers, chills, palpitations, abdominal pain, N/V/D, and dysuria. Pt was confused in ED , AxO 0 and open eyes on verbal stimulus but not following commands     In ED: pt is on 10L non rebreather, sat. 97%. On RA < 82%.  (10 Aug 2022 02:38)      Dx with acute hypoxic respiratory failure and left comminuted, displaced femoral neck fracture. Ddimer . GOHCO. Family undecided whether to go forth with surgical route. + CANDY. + hx dementia. DNR. Pain consulted for left hip pain. Pt seen and examined at bedside. Pt laying in bed, somnolent, O2 via nonrebreather in place. No nonverbal s/s of pain noted. Left leg shortened and externally rotated.     PAST MEDICAL & SURGICAL HISTORY:  Breast cancer      HTN (hypertension)      S/P right mastectomy          FAMILY HISTORY:      Social History:  Unable to assess ETOH use, illicit drug use and smoking    Allergies    penicillin (Unknown)    MEDICATIONS  (STANDING):  cefepime   IVPB      cefepime   IVPB 2000 milliGRAM(s) IV Intermittent every 12 hours  lactated ringers. 1000 milliLiter(s) (60 mL/Hr) IV Continuous <Continuous>  metroNIDAZOLE  IVPB 500 milliGRAM(s) IV Intermittent every 8 hours    MEDICATIONS  (PRN):  acetaminophen  Suppository .. 650 milliGRAM(s) Rectal every 6 hours PRN Temp greater or equal to 38C (100.4F)      Vital Signs Last 24 Hrs  T(C): 36.4 (10 Aug 2022 04:49), Max: 36.4 (09 Aug 2022 15:17)  T(F): 97.6 (10 Aug 2022 04:49), Max: 97.6 (09 Aug 2022 15:17)  HR: 75 (10 Aug 2022 04:49) (68 - 78)  BP: 125/46 (10 Aug 2022 04:49) (108/78 - 125/46)  BP(mean): 65 (10 Aug 2022 04:49) (65 - 65)  RR: 18 (10 Aug 2022 04:49) (18 - 18)  SpO2: 97% (10 Aug 2022 04:49) (97% - 98%)    Parameters below as of 10 Aug 2022 04:49  Patient On (Oxygen Delivery Method): mask, nonrebreather  O2 Flow (L/min): 10      LABS: Reviewed.                          10.6   16.56 )-----------( 313      ( 10 Aug 2022 08:35 )             37.1     08-10    137  |  101  |  53<H>  ----------------------------<  188<H>  5.6<H>   |  32<H>  |  1.66<H>    Ca    9.2      10 Aug 2022 08:35  Phos  5.3     08-10  Mg     2.6     08-10    TPro  7.3  /  Alb  1.6<L>  /  TBili  0.3  /  DBili  x   /  AST  12  /  ALT  16  /  AlkPhos  88  08-10    PT/INR - ( 09 Aug 2022 16:30 )   PT: 11.6 sec;   INR: 0.98 ratio         PTT - ( 09 Aug 2022 16:30 )  PTT:25.7 sec  LIVER FUNCTIONS - ( 10 Aug 2022 08:35 )  Alb: 1.6 g/dL / Pro: 7.3 g/dL / ALK PHOS: 88 U/L / ALT: 16 U/L DA / AST: 12 U/L / GGT: x           Urinalysis Basic - ( 10 Aug 2022 02:31 )    Color: Yellow / Appearance: Clear / S.015 / pH: x  Gluc: x / Ketone: Negative  / Bili: Negative / Urobili: 1   Blood: x / Protein: 100 / Nitrite: Negative   Leuk Esterase: Moderate / RBC: 2-5 /HPF / WBC 11-25 /HPF   Sq Epi: x / Non Sq Epi: Few /HPF / Bacteria: Moderate /HPF      CAPILLARY BLOOD GLUCOSE        SARS-CoV-2: NotDetec (09 Aug 2022 16:30)      Radiology: Reviewed.   < from: CT Angio Chest PE Protocol w/ IV Cont (22 @ 18:54) >    ACC: [id number] EXAM:  CT ANGIO CHEST PULM ART [facility]                          PROCEDURE DATE:  2022          INTERPRETATION:  Clinical information: Shortness of breath, hypoxia,   history of breast cancer, assess for PE    TECHNIQUE: A volumetric acquisition of the chest was obtained from the   thoracic inlet to the upper abdomen during dynamic administration of   intravenous contrast. 3D MIP images were provided.    CONTRAST/COMPLICATIONS:  IV Contrast: Omnipaque 350  73 cc administered   27 cc discarded  Oral Contrast: NONE  Complications: None reported at time of study completion    COMPARISON: No prior chest CT for comparison    FINDINGS:    CTA: The study is technically adequate with a good contrast bolus to the   pulmonary arteries. There are no filling defects in the pulmonary artery   or its branches. The heart is enlarged. No pericardial effusion. The mid   ascending aorta measures 3.5 cm. Calcified and noncalcified plaque in the   aortic arch.    Lungs/Airways/Pleura: Multifocal bronchiectasis most severe in the right   middle lobe lingula and medial right lower lobe. Centrilobular and   branching linear densities throughout all 5 lobes and a few areas of   consolidation in the right upper lobe. There are small left greater than   right pleural effusions.    Mediastinum/Lymph nodes: No thoracic adenopathy.    Upper Abdomen: There are bilateral renal cysts.    Bones and Soft Tissues: Status post right mastectomy.    IMPRESSION:    1.  No pulmonary thromboembolism    2.  Findings of chronic large and small airways infection with a few   patchy areas of consolidation which may represent acute worsening or   superimposed bacterial pneumonia    3.  Small left greater than right pleural effusions    --- End of Report---             KELVIN MACK M.D., Attending Radiologist  This document has been electronically signed. Aug  9 2022  8:13PM    < end of copied text >    < from: CT Pelvis Bony Only No Cont (22 @ 18:53) >    ACC: [id number] EXAM:  CT PELVIS BONY ONLY                          PROCEDURE DATE:  2022          INTERPRETATION:  INDICATION: Fall, left hip pain    TECHNIQUE: CT of the pelvis without contrast with axial, sagittal, and   coronal multiplanar reformats.    Comparison: None.    FINDINGS:    Acute, comminuted, displaced fracture of the   intertrochanteric/basicervical left femoral neck. There is shortening at   the fracture site with varus angulation and mild apex anterior angulation   at the fracture site. Left femoral head appears well-seated within the   acetabulum.    Mild bilateral hip arthrosis. Degenerative changes within the lower   lumbar spine, pubic symphysis, and bilateral sacroiliac joints.    Hematoma/soft tissue swelling surrounding the fracture site.    Colonic diverticulosis. Vascular calcifications noted.    IMPRESSION:    Comminuted, displaced left femoral neck fracture, as above.    --- End of Report ---            VANESSA MONIQUE M.D., ATTENDING RADIOLOGIST  This document has been electronically signed. Aug  9 2022  7:12PM    < end of copied text >      ORT Score -   unable to assess.     REVIEW OF SYSTEMS:  unable to assess    PHYSICAL EXAM:  limited d/t dementia  GENERAL:  + somnolent, no nonverbal s/s pain noted   RESPIRATORY: Respirations even and unlabored. Diminished to auscultation bilaterally; No rales, rhonchi, wheezing, or rubs + O2 via Nonrebreather   CARDIOVASCULAR: Normal S1/S2, regular rate and rhythm; No murmurs, rubs, or gallops. No JVD.   GASTROINTESTINAL:  Soft, Nontender, Nondistended; Bowel sounds present  PERIPHERAL VASCULAR:  Extremities warm without edema. 2+ Peripheral Pulses, No cyanosis   MUSCULOSKELETAL: Left leg shortened and externally rotated. Unable to assess motor Strength   SKIN: Warm, dry, intact. No rashes, lesions, scars or wounds.     Risk factors associated with adverse outcomes related to opioid treatment  [ ]  Concurrent benzodiazepine use  [ ]  History/ Active substance use or alcohol use disorder  [X ] Psychiatric co-morbidity  [ ] Sleep apnea  [ ] COPD  [ ] BMI> 35  [ ] Liver dysfunction  [X ] Renal dysfunction  [ ] CHF  [ ] Smoker  [X ]  Age > 60 years    [X ]  [place]  Reviewed and Copied to Chart. See below.

## 2022-08-10 NOTE — H&P ADULT - PROBLEM SELECTOR PLAN 2
pt is AAOx1 and started becoming confused in ED  likely due to acute hypoxic respiratory failure vs. left hip fracture pt was AAOx1 and started becoming more confused in ED  likely due to acute hypoxic respiratory failure vs. left hip fracture  No focal neurologic deficit on exam   -started on cefepime and flagyl   -f/u legionella  and mycoplasma antigen ab  -f/u BCx  -ID consulted Dr. Yusuf

## 2022-08-10 NOTE — H&P ADULT - PROBLEM SELECTOR PLAN 5
h/o HTN  unknown home meds  Primary team to confirm med recs h/o breast cancer  s/p right mastectomy elevated BUN and Cr, unknown baseline  BUN/Cr 53/1.5  c/w IVF  Avoid nephrotoxic  agent   s/p contrast in ED to r/o PE given elevated D dimer and hypoxia   Monitor UO and renal function

## 2022-08-10 NOTE — CONSULT NOTE ADULT - ASSESSMENT
HPI:  89 y/o F with PMH breast CA and HTN, ?dementia BIBEMS due to a fall. Pt is visiting her family from  and normally ambulates with a cane/assistance. She felt weak while getting up yesterday and fell on her left side. Her family kept in her in bed for the rest of the day. They called EMS after noting she was not able to get up this morning. The family also endorsed that she had SOB and a productive cough with green sputum since Saturday. En route to ED, EMS reported she was hypoxic with SaO2 < 70%. Denies CP, fevers, chills, palpitations, abdominal pain, N/V/D, and dysuria. Pt was confused in ED , AxO 0 and open eyes on verbal stimulus but not following commands     In ED: pt is on 10L non rebreather, sat. 97%. On RA < 82%.  (10 Aug 2022 02:38)      ALLERGIES penicillin (Unknown)    PAST MEDICAL & SURGICAL HISTORY:  Breast cancer      HTN (hypertension)      S/P right mastectomy        FAMILY HISTORY:    SOCIAL HISTORY:    REVIEW OF SYSTEMS:  CONSTITUTIONAL:  No weakness, fevers or chills  EYES/ENT:  No visual changes;  No vertigo or throat pain   NECK:  No pain or stiffness  RESPIRATORY:  No cough, wheezing, hemoptysis; No shortness of breath  CARDIOVASCULAR:  No chest pain or palpitations  GASTROINTESTINAL:  No abdominal or epigastric pain. No nausea, vomiting, or hematemesis; No diarrhea or constipation. No melena or hematochezia.  GENITOURINARY:  No dysuria, frequency or hematuria  NEUROLOGICAL:  No numbness or weakness  MSK: no back pain, no joint pain  SKIN:  No itching, rashes    PHYSICAL EXAM:  Vital Signs Last 24 Hrs  T(C): 36.4 (10 Aug 2022 04:49), Max: 36.4 (09 Aug 2022 15:17)  T(F): 97.6 (10 Aug 2022 04:49), Max: 97.6 (09 Aug 2022 15:17)  HR: 75 (10 Aug 2022 04:49) (68 - 78)  BP: 125/46 (10 Aug 2022 04:49) (108/78 - 125/46)  BP(mean): 65 (10 Aug 2022 04:49) (65 - 65)  RR: 18 (10 Aug 2022 04:49) (18 - 18)  SpO2: 97% (10 Aug 2022 04:49) (97% - 98%)    Parameters below as of 10 Aug 2022 04:49  Patient On (Oxygen Delivery Method): mask, nonrebreather  O2 Flow (L/min): 10      Gen:  Skin:  HEENT:  Neck:  Chest/Thorax:  Cardiovascular:  Abdomen:  Genitourinary:  Extremities:  Vascular:  MSK:  Neurological:    LABS/DIAGNOSTIC TESTS:                          10.6   16.56 )-----------( 313      ( 10 Aug 2022 08:35 )             37.1     WBC Count: 16.56 K/uL (08-10 @ 08:35)  WBC Count: 14.76 K/uL ( @ 16:30)    08-10    137  |  101  |  53<H>  ----------------------------<  188<H>  5.6<H>   |  32<H>  |  1.66<H>    Ca    9.2      10 Aug 2022 08:35  Phos  5.3     08-10  Mg     2.6     08-10    TPro  7.3  /  Alb  1.6<L>  /  TBili  0.3  /  DBili  x   /  AST  12  /  ALT  16  /  AlkPhos  88  10    Urinalysis Basic - ( 10 Aug 2022 02:31 )    Color: Yellow / Appearance: Clear / S.015 / pH: x  Gluc: x / Ketone: Negative  / Bili: Negative / Urobili: 1   Blood: x / Protein: 100 / Nitrite: Negative   Leuk Esterase: Moderate / RBC: 2-5 /HPF / WBC 11-25 /HPF   Sq Epi: x / Non Sq Epi: Few /HPF / Bacteria: Moderate /HPF      LIVER FUNCTIONS - ( 10 Aug 2022 08:35 )  Alb: 1.6 g/dL / Pro: 7.3 g/dL / ALK PHOS: 88 U/L / ALT: 16 U/L DA / AST: 12 U/L / GGT: x           LACTATE:Lactate, Blood: 1.5 mmol/L ( @ 16:30)      CULTURES:       Rapid RVP Result: NotDetec ( @ 16:30)        RADIOLOGY    ANTIBIOTICS:  cefepime   IVPB    cefepime   IVPB 2000 every 12 hours  metroNIDAZOLE  IVPB 500 every 8 hours      IV LINES:    IMPRESSION AND PLAN:      In progress.......      Please reach ID with any questions or concerns  Dr. Jewels Chandra  Tel. [phone]  Available in Teams               HPI: History obtained from Daughter Ms. Rei Caballero(phone # on record)  89 y/o F with PMH breast CA diagnosed in  S/P R breast mastectomy, did not received Chemo, as per family the CA was spread to axillary Lymphatic system 5 yras ago when attempted surgery back in [place], HTN, Dementia. BIBEMS after fall from her bed when attempting to to to the bathroom on . She was on her regular health state last Friday when the family noted she was weak and dizzy, decreased appetite and cough with sputum production, they asked her to rest, but she continues to feel sick and on top of that had a fall on [place]. Afebrile, no N/V or diarrhea, no urinary symptoms, + SOB, she has not used abx recently.  As per ED documentation she arrived confused, placed on non rebreather O2 mask 10 L, her O2 sats falls to 80's whe she is off O2.    ALLERGIES penicillin >10 yrs ago, she developed hives, tolerating cephalosporins    PAST MEDICAL & SURGICAL HISTORY: Breast cancer dx , surgery, 5 years ago sx but extensive mets encountered(as per daughter) she never received chemotherapy  HTN (hypertension)  S/P right mastectomy  FAMILY HISTORY: Non contributory  SOCIAL HISTORY: no toxic habits    REVIEW OF SYSTEMS:  Unable to obtain, the pt is in resp distress, with agonic breathing, obtunded, does not answer to questions    PHYSICAL EXAM:  Vital Signs Last 24 Hrs  T(C): 36.4 (10 Aug 2022 04:49), Max: 36.4 (09 Aug 2022 15:17)  T(F): 97.6 (10 Aug 2022 04:49), Max: 97.6 (09 Aug 2022 15:17)  HR: 75 (10 Aug 2022 04:49) (68 - 78)  BP: 125/46 (10 Aug 2022 04:49) (108/78 - 125/46)  BP(mean): 65 (10 Aug 2022 04:49) (65 - 65)  RR: 18 (10 Aug 2022 04:49) (18 - 18)  SpO2: 97% (10 Aug 2022 04:49) (97% - 98%)    Parameters below as of 10 Aug 2022 04:49  Patient On (Oxygen Delivery Method): mask, nonrebreather  O2 Flow (L/min): 10L  Gen: Ill appearing, frail old patient, pale, obtunded, does not follows commands or answer questions  Skin: pale  Head: AT  Neck: Supple  Chest/Thorax: decreased RR on my examination, + crackles BL lungs, agonic breathing, desaturation ~ 80's when the mask gets misplaced  Cardiovascular: S1S2 no murmurs  Abdomen: soft, no tenderness, no distention, BS +  Genitourinary: no Modi  Extremities: no cyanosis  Vascular: pedal pulses +  MSK: Left hip fracture  Neurological: AAOx0, obtunded    LABS/DIAGNOSTIC TESTS:                      10.6   16.56 )-----------( 313      ( 10 Aug 2022 08:35 )             37.1     WBC Count: 16.56 K/uL (08-10 @ 08:35)  WBC Count: 14.76 K/uL ( @ 16:30)    08-10    137  |  101  |  53<H>  ----------------------------<  188<H>  5.6<H>   |  32<H>  |  1.66<H>    Ca    9.2      10 Aug 2022 08:35  Phos  5.3     08-10  Mg     2.6     08-10    TPro  7.3  /  Alb  1.6<L>  /  TBili  0.3  /  DBili  x   /  AST  12  /  ALT  16  /  AlkPhos  88  08-10    Urinalysis Basic - ( 10 Aug 2022 02:31 )    Color: Yellow / Appearance: Clear / S.015 / pH: x  Gluc: x / Ketone: Negative  / Bili: Negative / Urobili: 1   Blood: x / Protein: 100 / Nitrite: Negative   Leuk Esterase: Moderate / RBC: 2-5 /HPF / WBC 11-25 /HPF   Sq Epi: x / Non Sq Epi: Few /HPF / Bacteria: Moderate /HPF       LACTATE:Lactate, Blood: 1.5 mmol/L ( @ 16:30)    CULTURES: pending    Rapid RVP Result: NotDetec ( @ 16:30)    RADIOLOGY:  < from: CT Pelvis Bony Only No Cont (22 @ 18:53) >Comminuted, displaced left femoral neck fracture.    < from: CT Angio Chest PE Protocol w/ IV Cont (22 @ 18:54) >  MPRESSION:  1.  No pulmonary thromboembolism  2.  Findings of chronic large and small airways infection with a few patchy areas of consolidation which may represent acute worsening or superimposed bacterial pneumonia  3.  Small left greater than right pleural effusions    ANTIBIOTICS:   cefepime   IVPB 2000 every 12 hours  metroNIDAZOLE  IVPB 500 every 8 hours    IV LINES: pIV, site OK    IMPRESSION AND PLAN:        Please reach ID with any questions or concerns  Dr. Jewels Chandra  Tel. [phone]  Available in Teams                   HPI: History obtained from Daughter Ms. Rei Caballero(phone # on record)  89 y/o F with PMH breast CA diagnosed in  S/P R breast mastectomy, did not received Chemo, as per family the CA was spread to axillary Lymphatic system 5 yras ago when attempted surgery back in [place], HTN, Dementia. BIBEMS after fall from her bed when attempting to to to the bathroom on . She was on her regular health state last Friday when the family noted she was weak and dizzy, decreased appetite and cough with sputum production, they asked her to rest, but she continues to feel sick and on top of that had a fall on [place]. Afebrile, no N/V or diarrhea, no urinary symptoms, + SOB, she has not used abx recently.  As per ED documentation she arrived confused, placed on non rebreather O2 mask 10 L, her O2 sats falls to 80's whe she is off O2.    ALLERGIES penicillin >10 yrs ago, she developed hives, tolerating cephalosporins    PAST MEDICAL & SURGICAL HISTORY: Breast cancer dx , surgery, 5 years ago sx but extensive mets encountered(as per daughter) she never received chemotherapy  HTN (hypertension)  S/P right mastectomy  FAMILY HISTORY: Non contributory  SOCIAL HISTORY: no toxic habits    REVIEW OF SYSTEMS:  Unable to obtain, the pt is in resp distress, with agonic breathing, obtunded, does not answer to questions    PHYSICAL EXAM:  Vital Signs Last 24 Hrs  T(C): 36.4 (10 Aug 2022 04:49), Max: 36.4 (09 Aug 2022 15:17)  T(F): 97.6 (10 Aug 2022 04:49), Max: 97.6 (09 Aug 2022 15:17)  HR: 75 (10 Aug 2022 04:49) (68 - 78)  BP: 125/46 (10 Aug 2022 04:49) (108/78 - 125/46)  BP(mean): 65 (10 Aug 2022 04:49) (65 - 65)  RR: 18 (10 Aug 2022 04:49) (18 - 18)  SpO2: 97% (10 Aug 2022 04:49) (97% - 98%)    Parameters below as of 10 Aug 2022 04:49  Patient On (Oxygen Delivery Method): mask, nonrebreather  O2 Flow (L/min): 10L  Gen: Ill appearing, frail old patient, pale, obtunded, does not follows commands or answer questions  Skin: pale  Head: AT  Neck: Supple  Chest/Thorax: decreased RR on my examination, + crackles BL lungs, agonic breathing, desaturation ~ 80's when the mask gets misplaced  Cardiovascular: S1S2 no murmurs  Abdomen: soft, no tenderness, no distention, BS +  Genitourinary: no Modi  Extremities: no cyanosis  Vascular: pedal pulses +  MSK: Left hip fracture  Neurological: AAOx0, obtunded    LABS/DIAGNOSTIC TESTS:                      10.6   16.56 )-----------( 313      ( 10 Aug 2022 08:35 )             37.1     WBC Count: 16.56 K/uL (08-10 @ 08:35)  WBC Count: 14.76 K/uL ( @ 16:30)    08-10    137  |  101  |  53<H>  ----------------------------<  188<H>  5.6<H>   |  32<H>  |  1.66<H>    Ca    9.2      10 Aug 2022 08:35  Phos  5.3     08-10  Mg     2.6     08-10    TPro  7.3  /  Alb  1.6<L>  /  TBili  0.3  /  DBili  x   /  AST  12  /  ALT  16  /  AlkPhos  88  08-10    Urinalysis Basic - ( 10 Aug 2022 02:31 )    Color: Yellow / Appearance: Clear / S.015 / pH: x  Gluc: x / Ketone: Negative  / Bili: Negative / Urobili: 1   Blood: x / Protein: 100 / Nitrite: Negative   Leuk Esterase: Moderate / RBC: 2-5 /HPF / WBC 11-25 /HPF   Sq Epi: x / Non Sq Epi: Few /HPF / Bacteria: Moderate /HPF       LACTATE:Lactate, Blood: 1.5 mmol/L ( @ 16:30)    CULTURES: pending    Rapid RVP Result: NotDetec ( @ 16:30)    RADIOLOGY:  < from: CT Pelvis Bony Only No Cont (22 @ 18:53) >Comminuted, displaced left femoral neck fracture.    < from: CT Angio Chest PE Protocol w/ IV Cont (22 @ 18:54) >  MPRESSION:  1.  No pulmonary thromboembolism  2.  Findings of chronic large and small airways infection with a few patchy areas of consolidation which may represent acute worsening or superimposed bacterial pneumonia  3.  Small left greater than right pleural effusions    ANTIBIOTICS:   cefepime   IVPB 2000 every 12 hours  metroNIDAZOLE  IVPB 500 every 8 hours    IV LINES: pIV, site OK    IMPRESSION AND PLAN:    -CAP  -Displaced Left femoral neck fracture S/P fall  -H/O Bronchiectasis  -H/O Breast CA diagnosed in  S/P R mastectomy as per family CA advanced w/ mets  -PCN allergy >10 yrs ago(Hives) tolerating cephalosporins    Please d/c cefepime/flagyl  Start Ceftriaxone 2g daily IV  Start Azithromycin for atypical coverage 500 mg 1st day then 250 mg daily x 4 days()  Follow up cultures  Send sputum cx if able  Legionella AG urine, strept pneumo, mycoplasma  Pt DNI/DNR clarify with family all interventions they would want in this case  Discussed with medicine attending    Please reach ID with any questions or concerns  Dr. Jewels Chandra  Tel. [phone]  Available in Teams                   HPI: History obtained from Daughter Ms. Rei Caballero(phone # on record)  89 y/o F with PMH breast CA diagnosed in  S/P R breast mastectomy, did not received Chemo, as per family the CA was spread to axillary Lymphatic system 5 yrs ago when attempted surgery back in [place], HTN, Dementia. BIBEMS after fall from her bed when attempting to to to the bathroom on . She was on her regular health state last Friday when the family noted she was weak and dizzy, decreased appetite and cough with sputum production, they asked her to rest, but she continues to feel sick and on top of that had a fall on [place]. Afebrile, no N/V or diarrhea, no urinary symptoms, + SOB, she has not used abx recently.  As per ED documentation she arrived confused, placed on non rebreather O2 mask 10 L, her O2 sats falls to 80's whe she is off O2.    ALLERGIES penicillin >10 yrs ago, she developed hives, tolerating cephalosporins    PAST MEDICAL & SURGICAL HISTORY: Breast cancer dx , surgery, 5 years ago sx but extensive mets encountered(as per daughter) she never received chemotherapy  HTN (hypertension)  S/P right mastectomy  FAMILY HISTORY: Non contributory  SOCIAL HISTORY: no toxic habits    REVIEW OF SYSTEMS:  Unable to obtain, the pt is in resp distress, with agonic breathing, obtunded, does not answer to questions    PHYSICAL EXAM:  Vital Signs Last 24 Hrs  T(C): 36.4 (10 Aug 2022 04:49), Max: 36.4 (09 Aug 2022 15:17)  T(F): 97.6 (10 Aug 2022 04:49), Max: 97.6 (09 Aug 2022 15:17)  HR: 75 (10 Aug 2022 04:49) (68 - 78)  BP: 125/46 (10 Aug 2022 04:49) (108/78 - 125/46)  BP(mean): 65 (10 Aug 2022 04:49) (65 - 65)  RR: 18 (10 Aug 2022 04:49) (18 - 18)  SpO2: 97% (10 Aug 2022 04:49) (97% - 98%)    Parameters below as of 10 Aug 2022 04:49  Patient On (Oxygen Delivery Method): mask, nonrebreather  O2 Flow (L/min): 10L  Gen: Ill appearing, frail old patient, pale, obtunded, does not follows commands or answer questions  Skin: pale  Head: AT  Neck: Supple  Chest/Thorax: decreased RR on my examination, + crackles BL lungs, agonic breathing, desaturation ~ 80's when the mask gets misplaced  Cardiovascular: S1S2 no murmurs  Abdomen: soft, no tenderness, no distention, BS +  Genitourinary: no Modi  Extremities: no cyanosis  Vascular: pedal pulses +  MSK: Left hip fracture  Neurological: AAOx0, obtunded    LABS/DIAGNOSTIC TESTS:                      10.6   16.56 )-----------( 313      ( 10 Aug 2022 08:35 )             37.1     WBC Count: 16.56 K/uL (08-10 @ 08:35)  WBC Count: 14.76 K/uL ( @ 16:30)    08-10    137  |  101  |  53<H>  ----------------------------<  188<H>  5.6<H>   |  32<H>  |  1.66<H>    Ca    9.2      10 Aug 2022 08:35  Phos  5.3     08-10  Mg     2.6     08-10    TPro  7.3  /  Alb  1.6<L>  /  TBili  0.3  /  DBili  x   /  AST  12  /  ALT  16  /  AlkPhos  88  08-10    Urinalysis Basic - ( 10 Aug 2022 02:31 )    Color: Yellow / Appearance: Clear / S.015 / pH: x  Gluc: x / Ketone: Negative  / Bili: Negative / Urobili: 1   Blood: x / Protein: 100 / Nitrite: Negative   Leuk Esterase: Moderate / RBC: 2-5 /HPF / WBC 11-25 /HPF   Sq Epi: x / Non Sq Epi: Few /HPF / Bacteria: Moderate /HPF       LACTATE:Lactate, Blood: 1.5 mmol/L ( @ 16:30)    CULTURES: pending    Rapid RVP Result: NotDetec ( @ 16:30)    RADIOLOGY:  < from: CT Pelvis Bony Only No Cont (22 @ 18:53) >Comminuted, displaced left femoral neck fracture.    < from: CT Angio Chest PE Protocol w/ IV Cont (22 @ 18:54) >  MPRESSION:  1.  No pulmonary thromboembolism  2.  Findings of chronic large and small airways infection with a few patchy areas of consolidation which may represent acute worsening or superimposed bacterial pneumonia  3.  Small left greater than right pleural effusions    ANTIBIOTICS:   cefepime   IVPB 2000 every 12 hours  metroNIDAZOLE  IVPB 500 every 8 hours    IV LINES: pIV, site OK    IMPRESSION AND PLAN:    -AHRF 2/2 CAP  -UTI(UA +)  -Displaced Left femoral neck fracture S/P fall  -H/O Bronchiectasis  -H/O Breast CA diagnosed in  S/P R mastectomy as per family CA advanced w/ mets  -PCN allergy >10 yrs ago(Hives) tolerating cephalosporins  CANDY      Please d/c cefepime/flagyl  Start Ceftriaxone 2g daily IV for 5-7 days of therapy  Start Azithromycin for atypical coverage 500 mg 1st day then 250 mg daily x 4 days()  Follow up cultures  Send sputum cx if able  Legionella AG urine, strept pneumo, mycoplasma  Pt DNI/DNR clarify with family all interventions they would want in this case(comfort care measures) undergoing Palliative eval  Discussed with medicine attending    Please reach ID with any questions or concerns  Dr. Jewels Chandra  Tel. [phone]  Available in Teams

## 2022-08-10 NOTE — CONSULT NOTE ADULT - PROBLEM SELECTOR RECOMMENDATION 6
s/p fall with right hip fracture.  Pt was ambulatory with a cane.    Currently bedbound. Total care  Supportive care  Frequent positioning    Comfort only

## 2022-08-10 NOTE — CONSULT NOTE ADULT - PROBLEM SELECTOR RECOMMENDATION 4
Pt is AOX3 at baseline.  Ambulatory, requires minimal assist with ADLs.  Discussed dementia trajectory with the pt's family

## 2022-08-10 NOTE — PATIENT PROFILE ADULT - FALL HARM RISK - HARM RISK INTERVENTIONS

## 2022-08-10 NOTE — CONSULT NOTE ADULT - SUBJECTIVE AND OBJECTIVE BOX
[facility] Geriatric and Palliative Consult Service:  Dr. Madai Camejo: cell ([phone])  Dr. Joanie Yusuf: cell ([phone])   Breanne Rosario NP: cell ([phone])  Judi Proctor NP: cell ([phone])   Teofilo Roque LSW: cell ([phone])     HPI:  89 y/o F with PMH breast CA and HTN, ?dementia BIBEMS due to a fall. Pt is visiting her family from  and normally ambulates with a cane/assistance. She felt weak while getting up yesterday and fell on her left side. Her family kept in her in bed for the rest of the day. They called EMS after noting she was not able to get up this morning. The family also endorsed that she had SOB and a productive cough with green sputum since Saturday. En route to ED, EMS reported she was hypoxic with SaO2 < 70%. Denies CP, fevers, chills, palpitations, abdominal pain, N/V/D, and dysuria. Pt was confused in ED , AxO 0 and open eyes on verbal stimulus but not following commands     In ED: pt is on 10L non rebreather, sat. 97%. On RA < 82%.  (10 Aug 2022 02:38)      PAST MEDICAL & SURGICAL HISTORY:  Breast cancer      HTN (hypertension)      S/P right mastectomy          SOCIAL HISTORY:    Admitted from:  home  (with HHA)           assisted living          [facility]       LTC   Substance abuse history:              Tobacco hx:                  Alcohol hx:              Home Opioid hx:  [employer]:                                    Preferred Language:    FAMILY HISTORY:   unable to obtain from patient due to poor mentation, family unable to give information  Baseline ADLs (prior to admission):    Allergies    penicillin (Unknown)    Intolerances      Present Symptoms:   Pain:  Fatigue:  Nausea:  Lack of Appetite:   SOB:  Depression:  Anxiety:  Review of Systems: [All others negative or Unable to obtain due to poor mentation]    MEDICATIONS  (STANDING):  cefepime   IVPB      cefepime   IVPB 2000 milliGRAM(s) IV Intermittent every 12 hours  HYDROmorphone  Injectable 0.5 milliGRAM(s) IV Push every 3 hours  lactated ringers. 1000 milliLiter(s) (60 mL/Hr) IV Continuous <Continuous>  vancomycin  IVPB 1000 milliGRAM(s) IV Intermittent once    MEDICATIONS  (PRN):  acetaminophen   IVPB .. 1000 milliGRAM(s) IV Intermittent once PRN Moderate Pain (4 - 6)  acetaminophen  Suppository .. 650 milliGRAM(s) Rectal every 6 hours PRN Temp greater or equal to 38C (100.4F)  haloperidol    Injectable 0.5 milliGRAM(s) IntraMuscular every 6 hours PRN Agitation  HYDROmorphone  Injectable 0.5 milliGRAM(s) IV Push every 1 hour PRN dyspnea      PHYSICAL EXAM:  Vital Signs Last 24 Hrs  T(C): 36.4 (10 Aug 2022 04:49), Max: 36.4 (09 Aug 2022 15:17)  T(F): 97.6 (10 Aug 2022 04:49), Max: 97.6 (09 Aug 2022 15:17)  HR: 71 (10 Aug 2022 11:33) (68 - 78)  BP: 121/48 (10 Aug 2022 11:33) (108/78 - 125/46)  BP(mean): 65 (10 Aug 2022 04:49) (65 - 65)  RR: 18 (10 Aug 2022 04:49) (18 - 18)  SpO2: 98% (10 Aug 2022 11:33) (97% - 98%)    Parameters below as of 10 Aug 2022 11:33  Patient On (Oxygen Delivery Method): mask, nonrebreather  O2 Flow (L/min): 10      General: alert  oriented x ____    lethargic distressed cachexia  nonverbal  unarousable verbal    Palliative Performance Scale/Karnofsky Score:  ECOG Performance:    HEENT: no abnormal lesion, dry mouth  ET tube/trach oral lesions:  Lungs: tachypnea/labored breathing  excessive secretions  CV: RRR, S1S2, tachycardia  GI: soft non distended non tender  incontinent               PEG/NG/OG tube  constipation  last BM:   : incontinent  oliguria/anuria  salmon  Musculoskeletal: weakness  edema   ambulatory with assistance    bedbound/wheelchair bound  Skin: no abnormal skin lesions, poor skin turgor, pressure ulcer stage:   Neuro: no deficits, cognitive impairment dsyphagia/dysarthria paresis  Oral intake ability: unable/only mouth care, minimal moderate full capability    LABS:                        10.6   16.56 )-----------( 313      ( 10 Aug 2022 08:35 )             37.1     08-10    137  |  101  |  53<H>  ----------------------------<  188<H>  5.6<H>   |  32<H>  |  1.66<H>    Ca    9.2      10 Aug 2022 08:35  Phos  5.3     08-10  Mg     2.6     08-10    TPro  7.3  /  Alb  1.6<L>  /  TBili  0.3  /  DBili  x   /  AST  12  /  ALT  16  /  AlkPhos  88  08-10    Urinalysis Basic - ( 10 Aug 2022 02:31 )    Color: Yellow / Appearance: Clear / S.015 / pH: x  Gluc: x / Ketone: Negative  / Bili: Negative / Urobili: 1   Blood: x / Protein: 100 / Nitrite: Negative   Leuk Esterase: Moderate / RBC: 2-5 /HPF / WBC 11-25 /HPF   Sq Epi: x / Non Sq Epi: Few /HPF / Bacteria: Moderate /HPF        RADIOLOGY & ADDITIONAL STUDIES:         [facility] Geriatric and Palliative Consult Service:  Dr. Madai Camejo: cell ([phone])  Dr. Joanie Yusuf: cell ([phone])   Breanne Rosario NP: cell ([phone])  Judi Proctor NP: cell ([phone])   Teofilo Roque LSW: cell ([phone])     HPI:  89 y/o F with PMH breast CA and HTN, ?dementia BIBEMS due to a fall. Pt is visiting her family from  and normally ambulates with a cane/assistance. She felt weak while getting up yesterday and fell on her left side. Her family kept in her in bed for the rest of the day. They called EMS after noting she was not able to get up this morning. The family also endorsed that she had SOB and a productive cough with green sputum since Saturday. En route to ED, EMS reported she was hypoxic with SaO2 < 70%. Denies CP, fevers, chills, palpitations, abdominal pain, N/V/D, and dysuria. Pt was confused in ED , AxO 0 and open eyes on verbal stimulus but not following commands     Interval hx:  Pt agitated, dyspneic on NRB. Daughter's Sila and Lizz present at the bedside.  Family opted for comfort measures/IPU.        PAST MEDICAL & SURGICAL HISTORY:  Breast cancer      HTN (hypertension)      S/P right mastectomy          SOCIAL HISTORY:    Admitted from:  home with family  Pt has 8 children who makes medical decisions together.  Primary contact Federico Minaya     [employer]:   Religion                   Preferred Language: Romanian    Federico Minaya  (dtr)  Phone#  [phone]    FAMILY HISTORY:   unable to obtain from patient due to poor mentation  Baseline ADLs (prior to admission): ambulatory, minimal assistance with ADLs    Allergies    penicillin (Unknown)    Intolerances      Present Symptoms:   Unable to obtain due to poor mentation]    MEDICATIONS  (STANDING):  cefepime   IVPB      cefepime   IVPB 2000 milliGRAM(s) IV Intermittent every 12 hours  HYDROmorphone  Injectable 0.5 milliGRAM(s) IV Push every 3 hours  lactated ringers. 1000 milliLiter(s) (60 mL/Hr) IV Continuous <Continuous>  vancomycin  IVPB 1000 milliGRAM(s) IV Intermittent once    MEDICATIONS  (PRN):  acetaminophen   IVPB .. 1000 milliGRAM(s) IV Intermittent once PRN Moderate Pain (4 - 6)  acetaminophen  Suppository .. 650 milliGRAM(s) Rectal every 6 hours PRN Temp greater or equal to 38C (100.4F)  haloperidol    Injectable 0.5 milliGRAM(s) IntraMuscular every 6 hours PRN Agitation  HYDROmorphone  Injectable 0.5 milliGRAM(s) IV Push every 1 hour PRN dyspnea      PHYSICAL EXAM:  Vital Signs Last 24 Hrs  T(C): 36.4 (10 Aug 2022 04:49), Max: 36.4 (09 Aug 2022 15:17)  T(F): 97.6 (10 Aug 2022 04:49), Max: 97.6 (09 Aug 2022 15:17)  HR: 71 (10 Aug 2022 11:33) (68 - 78)  BP: 121/48 (10 Aug 2022 11:33) (108/78 - 125/46)  BP(mean): 65 (10 Aug 2022 04:49) (65 - 65)  RR: 18 (10 Aug 2022 04:49) (18 - 18)  SpO2: 98% (10 Aug 2022 11:33) (97% - 98%)    Parameters below as of 10 Aug 2022 11:33  Patient On (Oxygen Delivery Method): mask, nonrebreather  O2 Flow (L/min): 10      General: Elderly woman appears to be actively dying.  Agitated dyspneic on NRB.      Palliative Performance Scale/Karnofsky Score: 10%  ECOG Performance: n/a    HEENT: temporal wasting, clear oropharynx neck supple  Lungs: labored on NRB  CV: RRR, S1S2  GI: soft non distended non tender  incontinent  : incontinent    Musculoskeletal: bedbound, b/l LE edema  Skin: no abnormal skin lesions, poor skin turgor  Neuro: unable to follow commands  Oral intake ability: unable/only mouth care    LABS:                        10.6   16.56 )-----------( 313      ( 10 Aug 2022 08:35 )             37.1     08-10    137  |  101  |  53<H>  ----------------------------<  188<H>  5.6<H>   |  32<H>  |  1.66<H>    Ca    9.2      10 Aug 2022 08:35  Phos  5.3     08-10  Mg     2.6     08-10    TPro  7.3  /  Alb  1.6<L>  /  TBili  0.3  /  DBili  x   /  AST  12  /  ALT  16  /  AlkPhos  88  08-10    Urinalysis Basic - ( 10 Aug 2022 02:31 )    Color: Yellow / Appearance: Clear / S.015 / pH: x  Gluc: x / Ketone: Negative  / Bili: Negative / Urobili: 1   Blood: x / Protein: 100 / Nitrite: Negative   Leuk Esterase: Moderate / RBC: 2-5 /HPF / WBC 11-25 /HPF   Sq Epi: x / Non Sq Epi: Few /HPF / Bacteria: Moderate /HPF    < from: CT Angio Chest PE Protocol w/ IV Cont (22 @ 18:54) >  IMPRESSION:    1.  No pulmonary thromboembolism    2.  Findings of chronic large and small airways infection with a few   patchy areas of consolidation which may represent acute worsening or   superimposed bacterial pneumonia    3.  Small left greater than right pleural effusions    < end of copied text >  < from: CT Pelvis Bony Only No Cont (22 @ 18:53) >  IMPRESSION:    Comminuted, displaced left femoral neck fracture, as above.    < end of copied text >      RADIOLOGY & ADDITIONAL STUDIES: reviewed  ADVANCED DIRECTIVES:  DNR/DNI/comfort measures only

## 2022-08-10 NOTE — H&P ADULT - PROBLEM SELECTOR PLAN 1
-pt p/w acute hypoxic respiratory failure, sat < 70% en route to ED  on 10L non rebreather, sat. 97%  -CT chest 8/9/22: chronic large and small airways infection with a few patchy areas of consolidation which may represent acute worsening or superimposed bacterial pneumonia  -CXR: Increased interstitial lung markings with small bilateral pleural   effusions and adjacent mild opacities  -started on cefipime and flagyl -pt p/w acute hypoxic respiratory failure, sat < 70% en route to ED  on 10L non rebreather, sat. 97%  -CT chest 8/9/22: chronic large and small airways infection with a few patchy areas of consolidation which may represent acute worsening or superimposed bacterial pneumonia  -CXR: Increased interstitial lung markings with small bilateral pleural   effusions and adjacent mild opacities  -WBC: 14.76  -started on cefipime and flagyl -pt p/w acute hypoxic respiratory failure, sat < 70% en route to ED  -on 10L non rebreather, sat. 97%, RA <82 % with increase work of breathing  -CTA chest 8/9/22: chronic large and small airways infection with a few patchy areas of consolidation which may represent acute worsening or superimposed bacterial pneumonia  -CXR: Increased interstitial lung markings with small bilateral pleural effusions and adjacent mild opacities  -WBC: 14.76  -AHRF likely in the setting of pneumonia  -s/p 1lit bolus in ED, will start on gentle IV fluids for 12 hrs   -started on cefepime and flagyl   -f/u legionella  and mycoplasma antigen ab  -f/u BCx  -ID consulted Dr. Yusuf  -s/s chinedu

## 2022-08-10 NOTE — CONSULT NOTE ADULT - PROBLEM SELECTOR RECOMMENDATION 7
MOLST on file for DNR/DNI, comfort measures only. Actively dying, prognosis could be anytime but likely hours to days. Appropriate for IPU for management of symptoms requiring IV medications. Family in agreement with plan.    Comfort meds as above  acetaminophen suppository prn fever  oral care  Please see discussion noted above in GOC conversation

## 2022-08-10 NOTE — CONSULT NOTE ADULT - PROBLEM SELECTOR RECOMMENDATION 3
s/p fall w left hip fracture.   No surgical intervention due to pt's prognosis    CT Pelvis: Comminuted, displaced left femoral neck fracture

## 2022-08-10 NOTE — H&P ADULT - PROBLEM SELECTOR PLAN 6
h/o HTN  unknown home meds  Primary team to confirm med recs h/o breast cancer  s/p right mastectomy  On ?anastrazole , Per EMS on anastrazole, family not sure

## 2022-08-10 NOTE — H&P ADULT - ASSESSMENT
89 y/o F with PMH breast CA (s/p right mastectomy) and HTN BIBEMS due to fall admitted for left leg fracture and acute hypoxic respiratory failure 89 y/o F with PMH breast CA (s/p right mastectomy) and HTN BIBEMS due to fall admitted for left leg fracture and acute hypoxic respiratory failure    Primary team to confirm home meds

## 2022-08-11 LAB
M PNEUMO IGM SER-ACNC: 0.23 INDEX — SIGNIFICANT CHANGE UP (ref 0–0.9)
MYCOPLASMA AG SPEC QL: NEGATIVE — SIGNIFICANT CHANGE UP

## 2022-08-14 LAB
CULTURE RESULTS: SIGNIFICANT CHANGE UP
CULTURE RESULTS: SIGNIFICANT CHANGE UP
SPECIMEN SOURCE: SIGNIFICANT CHANGE UP
SPECIMEN SOURCE: SIGNIFICANT CHANGE UP

## 2023-03-08 NOTE — ED PROVIDER NOTE - CARDIAC, MLM
What do you do next:   Continue your home medications unless we have specifically changed them  Take the antibiotics I have prescribed as directed.  You can use over-the-counter acetaminophen (Tylenol ) and ibuprofen for pain control for the next 2 to 3 days.  Acetaminophen (Tylenol): Take 500 to 1000 mg by mouth every 6 hours as needed for fever or pain.  Do not take more than 4000 total milligrams of acetaminophen-containing products in a 24-hour timeframe.  Ibuprofen: Take 600 milligrams by mouth every 6-8 hours as needed for fever or pain.  Take this with food or milk to avoid stomach upset.  Follow up as indicated below - it is important for you to be seen by OB/Gyn and/or a breast surgeon. You may need a specialty outpatient ultrasound of your breast or other diagnostic testing that we cannot perform in the Emergency Department    When do you return: If you have uncontrollable pain or fevers, or any other symptoms that concern you, please return to the ED for reevaluation.    Thank you for allowing us to care for you today.     Normal rate, regular rhythm.  Heart sounds S1, S2.  No murmurs, rubs or gallops.